# Patient Record
Sex: FEMALE | Race: WHITE | Employment: UNEMPLOYED | URBAN - METROPOLITAN AREA
[De-identification: names, ages, dates, MRNs, and addresses within clinical notes are randomized per-mention and may not be internally consistent; named-entity substitution may affect disease eponyms.]

---

## 2021-03-01 ENCOUNTER — THERAPY VISIT (OUTPATIENT)
Dept: PHYSICAL THERAPY | Facility: CLINIC | Age: 21
End: 2021-03-01
Payer: COMMERCIAL

## 2021-03-01 DIAGNOSIS — M25.571 PAIN IN JOINT INVOLVING ANKLE AND FOOT, RIGHT: ICD-10-CM

## 2021-03-01 PROCEDURE — 97110 THERAPEUTIC EXERCISES: CPT | Mod: GP | Performed by: PHYSICAL THERAPIST

## 2021-03-01 PROCEDURE — 97112 NEUROMUSCULAR REEDUCATION: CPT | Mod: GP | Performed by: PHYSICAL THERAPIST

## 2021-03-01 PROCEDURE — 97161 PT EVAL LOW COMPLEX 20 MIN: CPT | Mod: GP | Performed by: PHYSICAL THERAPIST

## 2021-03-01 NOTE — LETTER
[facility]  [address] [place] [id number]  [phone]    2021    Re: Awilda Zavala   :2000  MRN:  [id number]   REFERRING PHYSICIAN:   Zane Belle    [facility]    Date of Initial Evaluation:  2021  Visits:  Rxs Used: 1  Reason for Referral:  Pain in joint involving ankle and foot, right    EVALUATION SUMMARY    [facility] Initial Evaluation  Subjective:    Patient Health History  Awilda Zavala being seen for R lower leg pain.     Problem began: 2020.   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  General health as reported by patient is good.  Pertinent medical history includes: cancer.   Red flags:  None as reported by patient.  Medical allergies: none.   Surgeries include:  Cancer surgery.    Current medications:  None.    Current occupation is None.   Primary job tasks include:  Lifting/carrying and driving.            Therapist Generated HPI Evaluation  Problem details: Pt presents to clinic with ongoing complaints of R lower leg pain and weakness. Pt is S/P resection of R fibula for mandible due to osteosarcoma on 2020. Pt also had removal of muscle and skin graft, with extensive scarring noted along lateral R lower leg. Pt has continued to have difficulty with prolonged activity and poor balance. Pt had MD appointment on 2/10/2021 and referred to PT. .         Type of problem:  Right ankle.  This is a new condition.  Condition occurred with:  Other reason.  Where condition occurred: for unknown reasons.  Patient reports pain:  Lateral.  Pain is described as aching and is intermittent.  Pain radiates to:  No radiation. Pain is the same all the time (activity dependent).  Since onset symptoms are gradually improving.  Associated symptoms:  Loss of motion/stiffness, loss of strength and numbness. Symptoms are exacerbated by  ascending stairs, bending/squatting, descending stairs, standing, walking and weight bearing  and relieved by rest.   Awilda Zavala   :2000- page 2  Past treatment: none. There was none improvement following previous treatment.  Restrictions due to condition include:  Working in normal job without restrictions.  Barriers include:  None as reported by patient.          Objective:    Gait:    Gait Type:  Antalgic   Assistive Devices:  None  Deviations:  Hip:  Trendelenberg RAnkle:  Push off decr R  Flexibility/Screens:   Lower Extremity:  Decreased right lower extremity flexibility:  Gastroc  Ankle/Foot Evaluation  ROM:    AROM:    Dorsiflexion:  Left:   15  Right:   10  Plantarflexion:  Left:  55    Right:  55  Inversion:  Left:  WNL     Right:  75%  Eversion:  WNL     Right:  75%  Strength:    Dorsiflexion:  Left: 5/5     Pain:   Right: 4+/5   Pain:  Plantarflexion: Left: 5/5   Pain:   Right: 4+/5  Pain:  Inversion:Left: 5/5  Pain:     Right: 5-/5  Pain:  Eversion:Left: 5/5  Pain:  Right: 4+/5  Pain:  Strength wnl ankle: glute med 4/5 R, 4+/5 L; glute max 4/5 R, 4+/5 L.  FUNCTIONAL TESTS:   Quad:  Single Leg Squat Left: Control is moderate loss of control.  Single Leg Squat Right: Control is significant loss of control, hip substitution and femoral IR  Bilateral Leg Squat:  Control is mild loss of control and excessive anterior knee excursion  Proprioception:  Stork Balance Test: Left: EO x 30 sec  Right: EO x 3 sec       Assessment/Plan:    Patient is a 20 year old female with right side ankle complaints.    Patient has the following significant findings with corresponding treatment plan.                Diagnosis 1:  R lower leg pain following resection of R fibula  Pain -  hot/cold therapy, manual therapy, self management, education and home program  Decreased ROM/flexibility - manual therapy, therapeutic exercise, therapeutic activity and home program  Decreased strength - therapeutic exercise,  therapeutic activities and home program  Impaired balance - neuro re-education, therapeutic activities and home program  Impaired gait - gait training, assistive devices and home program  Impaired muscle performance - neuro re-education and home program  Decreased function - therapeutic activities and home program    Therapy Evaluation Codes:   1) History comprised of:   Personal factors that impact the plan of care:      None.    Awilda Zavala   :2000- page 3     Comorbidity factors that impact the plan of care are:      Cancer.     Medications impacting care: None.  2) Examination of Body Systems comprised of:   Body structures and functions that impact the plan of care:      Ankle.   Activity limitations that impact the plan of care are:      Lifting, Squatting/kneeling, Stairs, Standing and Walking.  3) Clinical presentation characteristics are:   Stable/Uncomplicated.  4) Decision-Making    Low complexity using standardized patient assessment instrument and/or measureable assessment of functional outcome.  Cumulative Therapy Evaluation is: Low complexity.    Previous and current functional limitations:  (See Goal Flow Sheet for this information)    Short term and Long term goals: (See Goal Flow Sheet for this information)     Communication ability:  Patient appears to be able to clearly communicate and understand verbal and written communication and follow directions correctly.  Treatment Explanation - The following has been discussed with the patient:   RX ordered/plan of care  Anticipated outcomes  Possible risks and side effects  This patient would benefit from PT intervention to resume normal activities.   Rehab potential is good.    Frequency:  1 X week, once daily  Duration:  for 8 weeks  Discharge Plan:  Achieve all LTG.  Independent in home treatment program.  Return to previous functional level by discharge.  Reach maximal therapeutic benefit.      Thank you for your  referral.    INQUIRIES  Therapist: ELIESER Ritchie [address] [address]  Phone: [phone]  Fax: [phone]

## 2021-03-01 NOTE — PROGRESS NOTES
[facility] Initial Evaluation  Subjective:    Patient Health History  Awilda Zavala being seen for R lower leg pain.     Problem began: 5/19/2020.   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  General health as reported by patient is good.  Pertinent medical history includes: cancer.   Red flags:  None as reported by patient.  Medical allergies: none.   Surgeries include:  Cancer surgery.    Current medications:  None.    Current occupation is None.   Primary job tasks include:  Lifting/carrying and driving.                  Therapist Generated HPI Evaluation  Problem details: Pt presents to clinic with ongoing complaints of R lower leg pain and weakness. Pt is S/P resection of R fibula for mandible due to osteosarcoma on 5/19/2020. Pt also had removal of muscle and skin graft, with extensive scarring noted along lateral R lower leg. Pt has continued to have difficulty with prolonged activity and poor balance. Pt had MD appointment on 2/10/2021 and referred to PT. .         Type of problem:  Right ankle.    This is a new condition.  Condition occurred with:  Other reason.  Where condition occurred: for unknown reasons.  Patient reports pain:  Lateral.  Pain is described as aching and is intermittent.  Pain radiates to:  No radiation. Pain is the same all the time (activity dependent).  Since onset symptoms are gradually improving.  Associated symptoms:  Loss of motion/stiffness, loss of strength and numbness. Symptoms are exacerbated by ascending stairs, bending/squatting, descending stairs, standing, walking and weight bearing  and relieved by rest.    Past treatment: none. There was none improvement following previous treatment.  Restrictions due to condition include:  Working in normal job without restrictions.  Barriers include:  None as reported by patient.                        Objective:    Gait:    Gait Type:   Antalgic   Assistive Devices:  None  Deviations:  Hip:  Trendelenberg RAnkle:  Push off decr R    Flexibility/Screens:       Lower Extremity:      Decreased right lower extremity flexibility:  Gastroc          Ankle/Foot Evaluation  ROM:    AROM:    Dorsiflexion:  Left:   15  Right:   10  Plantarflexion:  Left:  55    Right:  55  Inversion:  Left:  WNL     Right:  75%  Eversion:  WNL     Right:  75%        Strength:    Dorsiflexion:  Left: 5/5     Pain:   Right: 4+/5   Pain:  Plantarflexion: Left: 5/5   Pain:   Right: 4+/5  Pain:  Inversion:Left: 5/5  Pain:     Right: 5-/5  Pain:  Eversion:Left: 5/5  Pain:  Right: 4+/5  Pain:              Strength wnl ankle: glute med 4/5 R, 4+/5 L; glute max 4/5 R, 4+/5 L.            FUNCTIONAL TESTS:         Quad:  Single Leg Squat Left: Control is moderate loss of control.  Single Leg Squat Right: Control is significant loss of control, hip substitution and femoral IR  Bilateral Leg Squat:  Control is mild loss of control and excessive anterior knee excursion    Proprioception:  Givesparkk Balance Test: Left: EO x 30 sec  Right: EO x 3 sec                                                     General     ROS    Assessment/Plan:    Patient is a 20 year old female with right side ankle complaints.    Patient has the following significant findings with corresponding treatment plan.                Diagnosis 1:  R lower leg pain following resection of R fibula  Pain -  hot/cold therapy, manual therapy, self management, education and home program  Decreased ROM/flexibility - manual therapy, therapeutic exercise, therapeutic activity and home program  Decreased strength - therapeutic exercise, therapeutic activities and home program  Impaired balance - neuro re-education, therapeutic activities and home program  Impaired gait - gait training, assistive devices and home program  Impaired muscle performance - neuro re-education and home program  Decreased function - therapeutic activities and  home program    Therapy Evaluation Codes:   1) History comprised of:   Personal factors that impact the plan of care:      None.    Comorbidity factors that impact the plan of care are:      Cancer.     Medications impacting care: None.  2) Examination of Body Systems comprised of:   Body structures and functions that impact the plan of care:      Ankle.   Activity limitations that impact the plan of care are:      Lifting, Squatting/kneeling, Stairs, Standing and Walking.  3) Clinical presentation characteristics are:   Stable/Uncomplicated.  4) Decision-Making    Low complexity using standardized patient assessment instrument and/or measureable assessment of functional outcome.  Cumulative Therapy Evaluation is: Low complexity.    Previous and current functional limitations:  (See Goal Flow Sheet for this information)    Short term and Long term goals: (See Goal Flow Sheet for this information)     Communication ability:  Patient appears to be able to clearly communicate and understand verbal and written communication and follow directions correctly.  Treatment Explanation - The following has been discussed with the patient:   RX ordered/plan of care  Anticipated outcomes  Possible risks and side effects  This patient would benefit from PT intervention to resume normal activities.   Rehab potential is good.    Frequency:  1 X week, once daily  Duration:  for 8 weeks  Discharge Plan:  Achieve all LTG.  Independent in home treatment program.  Return to previous functional level by discharge.  Reach maximal therapeutic benefit.    Please refer to the daily flowsheet for treatment today, total treatment time and time spent performing 1:1 timed codes.

## 2021-03-29 ENCOUNTER — THERAPY VISIT (OUTPATIENT)
Dept: PHYSICAL THERAPY | Facility: CLINIC | Age: 21
End: 2021-03-29
Payer: COMMERCIAL

## 2021-03-29 DIAGNOSIS — M25.571 PAIN IN JOINT INVOLVING ANKLE AND FOOT, RIGHT: ICD-10-CM

## 2021-03-29 PROCEDURE — 97110 THERAPEUTIC EXERCISES: CPT | Mod: GP | Performed by: PHYSICAL THERAPIST

## 2021-03-29 PROCEDURE — 97112 NEUROMUSCULAR REEDUCATION: CPT | Mod: GP | Performed by: PHYSICAL THERAPIST

## 2021-05-17 PROBLEM — M25.571 PAIN IN JOINT INVOLVING ANKLE AND FOOT, RIGHT: Status: RESOLVED | Noted: 2021-03-01 | Resolved: 2021-05-17

## 2021-05-17 NOTE — PROGRESS NOTES
Discharge Note    Progress reporting period is from initial evaluation date (please see noted date below) to Mar 29, 2021.  Linked Episodes   Type: Episode: Status: Noted: Resolved: Last update: Updated by:   PHYSICAL THERAPY R lower leg 3/1/2021 Active 3/1/2021  3/29/2021  1:29 PM Zane Vo, PT      Comments:       Awilda failed to follow up and current status is unknown.  Please see information below for last relevant information on current status.  Patient seen for 2 visits.    SUBJECTIVE  Subjective changes noted by patient:  Pt reports overall functioning at 75% at this time. Having difficulty with quicker movements. Continues to complain of numbness along skin graft. Has been able to walk 2 miles without difficulty.   .  Current pain level is 0/10.     Previous pain level was  3/10.   Changes in function:  Yes (See Goal flowsheet attached for changes in current functional level)  Adverse reaction to treatment or activity: None    OBJECTIVE  Changes noted in objective findings: EO SL balance x 5 sec R LE, x 20 sec L LE. Poor LE mechanics with SL squat with increased hip substitution     ASSESSMENT/PLAN  Diagnosis: R lower leg pain following resection of R fibula   Updated problem list and treatment plan:   Pain - HEP  Decreased ROM/flexibility - HEP  Decreased strength - HEP  Impaired muscle performance - HEP  Impaired posture - HEP  STG/LTGs have been met or progress has been made towards goals:  Yes, please see goal flowsheet for most current information  Assessment of Progress: current status is unknown.    Last current status: Pt is progressing as expected   Self Management Plans:  HEP  I have re-evaluated this patient and find that the nature, scope, duration and intensity of the therapy is appropriate for the medical condition of the patient.  Awilda continues to require the following intervention to meet STG and LTG's:  HEP.    Recommendations:  Discharge with current home program.  Patient to follow  up with MD as needed.    Please refer to the daily flowsheet for treatment today, total treatment time and time spent performing 1:1 timed codes.

## 2022-08-02 ENCOUNTER — LAB REQUISITION (OUTPATIENT)
Dept: LAB | Facility: CLINIC | Age: 22
End: 2022-08-02

## 2022-08-02 DIAGNOSIS — Z12.4 ENCOUNTER FOR SCREENING FOR MALIGNANT NEOPLASM OF CERVIX: ICD-10-CM

## 2022-08-02 PROCEDURE — G0145 SCR C/V CYTO,THINLAYER,RESCR: HCPCS | Performed by: ADVANCED PRACTICE MIDWIFE

## 2022-08-05 LAB
BKR LAB AP GYN ADEQUACY: NORMAL
BKR LAB AP GYN INTERPRETATION: NORMAL
BKR LAB AP HPV REFLEX: NORMAL
BKR LAB AP LMP: NORMAL
BKR LAB AP PREVIOUS ABNL DX: NORMAL
BKR LAB AP PREVIOUS ABNORMAL: NORMAL
PATH REPORT.COMMENTS IMP SPEC: NORMAL
PATH REPORT.COMMENTS IMP SPEC: NORMAL
PATH REPORT.RELEVANT HX SPEC: NORMAL

## 2024-02-29 ENCOUNTER — LAB REQUISITION (OUTPATIENT)
Dept: LAB | Facility: CLINIC | Age: 24
End: 2024-02-29

## 2024-02-29 DIAGNOSIS — Z36.89 ENCOUNTER FOR OTHER SPECIFIED ANTENATAL SCREENING: ICD-10-CM

## 2024-02-29 LAB
BASOPHILS # BLD AUTO: 0 10E3/UL (ref 0–0.2)
BASOPHILS NFR BLD AUTO: 1 %
EOSINOPHIL # BLD AUTO: 0.1 10E3/UL (ref 0–0.7)
EOSINOPHIL NFR BLD AUTO: 1 %
ERYTHROCYTE [DISTWIDTH] IN BLOOD BY AUTOMATED COUNT: 13.1 % (ref 10–15)
HCT VFR BLD AUTO: 38.3 % (ref 35–47)
HGB BLD-MCNC: 12.8 G/DL (ref 11.7–15.7)
HIV 1+2 AB+HIV1 P24 AG SERPL QL IA: NONREACTIVE
HOLD SPECIMEN: NORMAL
IMM GRANULOCYTES # BLD: 0 10E3/UL
IMM GRANULOCYTES NFR BLD: 1 %
LYMPHOCYTES # BLD AUTO: 1.9 10E3/UL (ref 0.8–5.3)
LYMPHOCYTES NFR BLD AUTO: 30 %
MCH RBC QN AUTO: 29.1 PG (ref 26.5–33)
MCHC RBC AUTO-ENTMCNC: 33.4 G/DL (ref 31.5–36.5)
MCV RBC AUTO: 87 FL (ref 78–100)
MONOCYTES # BLD AUTO: 0.5 10E3/UL (ref 0–1.3)
MONOCYTES NFR BLD AUTO: 7 %
NEUTROPHILS # BLD AUTO: 4 10E3/UL (ref 1.6–8.3)
NEUTROPHILS NFR BLD AUTO: 60 %
NRBC # BLD AUTO: 0 10E3/UL
NRBC BLD AUTO-RTO: 0 /100
PLATELET # BLD AUTO: 173 10E3/UL (ref 150–450)
RBC # BLD AUTO: 4.4 10E6/UL (ref 3.8–5.2)
WBC # BLD AUTO: 6.5 10E3/UL (ref 4–11)

## 2024-02-29 PROCEDURE — 86803 HEPATITIS C AB TEST: CPT | Performed by: ADVANCED PRACTICE MIDWIFE

## 2024-02-29 PROCEDURE — 87340 HEPATITIS B SURFACE AG IA: CPT | Performed by: ADVANCED PRACTICE MIDWIFE

## 2024-02-29 PROCEDURE — 87389 HIV-1 AG W/HIV-1&-2 AB AG IA: CPT | Performed by: ADVANCED PRACTICE MIDWIFE

## 2024-02-29 PROCEDURE — 86900 BLOOD TYPING SEROLOGIC ABO: CPT | Performed by: ADVANCED PRACTICE MIDWIFE

## 2024-02-29 PROCEDURE — 85004 AUTOMATED DIFF WBC COUNT: CPT | Performed by: ADVANCED PRACTICE MIDWIFE

## 2024-02-29 PROCEDURE — 86762 RUBELLA ANTIBODY: CPT | Performed by: ADVANCED PRACTICE MIDWIFE

## 2024-02-29 PROCEDURE — 87086 URINE CULTURE/COLONY COUNT: CPT | Performed by: ADVANCED PRACTICE MIDWIFE

## 2024-03-01 LAB
ABO/RH(D): NORMAL
ANTIBODY SCREEN: NEGATIVE
BACTERIA UR CULT: NO GROWTH
HBV SURFACE AG SERPL QL IA: NONREACTIVE
HCV AB SERPL QL IA: NONREACTIVE
RUBV IGG SERPL QL IA: 1.22 INDEX
RUBV IGG SERPL QL IA: POSITIVE
SPECIMEN EXPIRATION DATE: NORMAL

## 2024-03-04 LAB — RPR SER QL: NONREACTIVE

## 2024-07-01 ENCOUNTER — LAB REQUISITION (OUTPATIENT)
Dept: LAB | Facility: CLINIC | Age: 24
End: 2024-07-01

## 2024-07-01 DIAGNOSIS — Z86.2 PERSONAL HISTORY OF DISEASES OF THE BLOOD AND BLOOD-FORMING ORGANS AND CERTAIN DISORDERS INVOLVING THE IMMUNE MECHANISM: ICD-10-CM

## 2024-07-01 DIAGNOSIS — Z13.29 ENCOUNTER FOR SCREENING FOR OTHER SUSPECTED ENDOCRINE DISORDER: ICD-10-CM

## 2024-07-01 LAB
FERRITIN SERPL-MCNC: 57 NG/ML (ref 6–175)
HOLD SPECIMEN: NORMAL
T3FREE SERPL-MCNC: 2.8 PG/ML (ref 2–4.4)
T4 FREE SERPL-MCNC: 0.92 NG/DL (ref 0.9–1.7)
TSH SERPL DL<=0.005 MIU/L-ACNC: 0.4 UIU/ML (ref 0.3–4.2)

## 2024-07-01 PROCEDURE — 84439 ASSAY OF FREE THYROXINE: CPT | Performed by: ADVANCED PRACTICE MIDWIFE

## 2024-07-01 PROCEDURE — 82728 ASSAY OF FERRITIN: CPT | Performed by: ADVANCED PRACTICE MIDWIFE

## 2024-07-01 PROCEDURE — 84481 FREE ASSAY (FT-3): CPT | Performed by: ADVANCED PRACTICE MIDWIFE

## 2024-07-01 PROCEDURE — 84443 ASSAY THYROID STIM HORMONE: CPT | Performed by: ADVANCED PRACTICE MIDWIFE
